# Patient Record
Sex: FEMALE | Race: OTHER | Employment: FULL TIME | URBAN - METROPOLITAN AREA
[De-identification: names, ages, dates, MRNs, and addresses within clinical notes are randomized per-mention and may not be internally consistent; named-entity substitution may affect disease eponyms.]

---

## 2020-01-31 ENCOUNTER — TELEPHONE (OUTPATIENT)
Dept: OBGYN | Age: 20
End: 2020-01-31

## 2020-08-14 ENCOUNTER — HOSPITAL ENCOUNTER (EMERGENCY)
Age: 20
Discharge: LEFT BEFORE DISCHARGE | End: 2020-08-14
Attending: EMERGENCY MEDICINE

## 2025-04-23 ENCOUNTER — OFFICE VISIT (OUTPATIENT)
Age: 25
End: 2025-04-23
Payer: MEDICAID

## 2025-04-23 VITALS
RESPIRATION RATE: 18 BRPM | DIASTOLIC BLOOD PRESSURE: 67 MMHG | TEMPERATURE: 98.8 F | HEIGHT: 61 IN | SYSTOLIC BLOOD PRESSURE: 109 MMHG | BODY MASS INDEX: 44.82 KG/M2 | WEIGHT: 237.4 LBS | HEART RATE: 87 BPM

## 2025-04-23 DIAGNOSIS — G47.9 SLEEP DISTURBANCE: ICD-10-CM

## 2025-04-23 DIAGNOSIS — N60.12 FIBROCYSTIC BREAST CHANGES OF BOTH BREASTS: ICD-10-CM

## 2025-04-23 DIAGNOSIS — N60.11 FIBROCYSTIC BREAST CHANGES OF BOTH BREASTS: ICD-10-CM

## 2025-04-23 DIAGNOSIS — Z00.00 BLOOD TESTS FOR ROUTINE GENERAL PHYSICAL EXAMINATION: ICD-10-CM

## 2025-04-23 DIAGNOSIS — Z78.9 NONSMOKER: ICD-10-CM

## 2025-04-23 DIAGNOSIS — E66.813 CLASS 3 SEVERE OBESITY DUE TO EXCESS CALORIES WITHOUT SERIOUS COMORBIDITY WITH BODY MASS INDEX (BMI) OF 40.0 TO 44.9 IN ADULT (HCC): Primary | ICD-10-CM

## 2025-04-23 PROCEDURE — 99212 OFFICE O/P EST SF 10 MIN: CPT

## 2025-04-23 PROCEDURE — 99214 OFFICE O/P EST MOD 30 MIN: CPT | Performed by: FAMILY MEDICINE

## 2025-04-23 SDOH — ECONOMIC STABILITY: FOOD INSECURITY: WITHIN THE PAST 12 MONTHS, YOU WORRIED THAT YOUR FOOD WOULD RUN OUT BEFORE YOU GOT MONEY TO BUY MORE.: NEVER TRUE

## 2025-04-23 SDOH — ECONOMIC STABILITY: FOOD INSECURITY: WITHIN THE PAST 12 MONTHS, THE FOOD YOU BOUGHT JUST DIDN'T LAST AND YOU DIDN'T HAVE MONEY TO GET MORE.: NEVER TRUE

## 2025-04-23 ASSESSMENT — PATIENT HEALTH QUESTIONNAIRE - PHQ9
1. LITTLE INTEREST OR PLEASURE IN DOING THINGS: NOT AT ALL
SUM OF ALL RESPONSES TO PHQ QUESTIONS 1-9: 0
2. FEELING DOWN, DEPRESSED OR HOPELESS: NOT AT ALL
SUM OF ALL RESPONSES TO PHQ QUESTIONS 1-9: 0

## 2025-04-23 NOTE — PATIENT INSTRUCTIONS
Follow up on 5/16/2025 physical  Will get labs today  Will order sleep study that you can do at [facility]  Evening primrose oil talbets or topical would be beneficial for your breast pain and eliminating caffeine as well as weight loss   Recommend starting with 150minutes of moderate intensity exercise per week   Recommend reducing carbs and limiting meals to 2 hours prior to sleeping and higher protein meals  Please call the office if you have any questions or concerns  If you have not signed up for [employer] please sign up, you can reach out to me, see your lab results and request refills for medications    Appointments can be made via [employer] as well, with the standard coming 15 minutes prior to appointments  Thank you for your visit today!

## 2025-04-23 NOTE — PROGRESS NOTES
[facility]  [address] [place] [address]  Phone: [phone]  Fax: [phone]      Date of Visit:  2025  Patient Name: Eunice Clark   Patient :  2000     Chief Complaint   Patient presents with    Establish Care     Re-established.  Was with us about 3 years ago.      Breast Problem     Noticed \"bruising\" around the breast area.  Did breast feed/ pump, not sure if this is the cause.    Maternal grandmother: history of colon cancer  Maternal grandfather: cancer    Weight Management     Just had a baby about a year ago.      HPI:     Eunice Clark is a 25 y.o. female for weight management and breast tenderness. She states that her breast get more tender around the time of her menstrual cycle. She states that it got worse after her last pregnancy where she breast fed for about a month and has not breast fed in about a year. Discussed that this is likely fibrocystic changes of the breast and decreased caffeine and primrose oil can help. She is open to trying to that. As far as weight loss at this time she would like to trying diet and exercise regimen. She currently states that her diet is poor and is high in carbs and she does not much for exercise. Discussed recommendations as below. She has been told that she snores and discussed that sleep apnea can contribute to weight gain. Discussed sending for a sleep study which she is agreeable to. No other concerns today.     I personally reviewed the patient's past medical history, current medications, allergies, surgical history, family history and social history.  Updates were made as necessary.    REVIEW OF SYSTEM      As Per HPI    REVIEWED INFORMATION      Allergies   Allergen Reactions    Peanut-Containing Drug Products        There is no problem list on file for this patient.      Past Medical History:   Diagnosis Date    Abnormal uterine bleeding     Anxiety        Past

## 2025-05-16 DIAGNOSIS — G47.9 SLEEP DISTURBANCE: Primary | ICD-10-CM

## 2025-05-30 ENCOUNTER — OFFICE VISIT (OUTPATIENT)
Age: 25
End: 2025-05-30
Payer: MEDICAID

## 2025-05-30 ENCOUNTER — HOSPITAL ENCOUNTER (OUTPATIENT)
Age: 25
Setting detail: SPECIMEN
Discharge: HOME | End: 2025-05-30

## 2025-05-30 VITALS
HEIGHT: 61 IN | HEART RATE: 85 BPM | DIASTOLIC BLOOD PRESSURE: 64 MMHG | WEIGHT: 234 LBS | SYSTOLIC BLOOD PRESSURE: 107 MMHG | RESPIRATION RATE: 16 BRPM | BODY MASS INDEX: 44.18 KG/M2 | TEMPERATURE: 98.2 F

## 2025-05-30 DIAGNOSIS — E66.813 CLASS 3 SEVERE OBESITY DUE TO EXCESS CALORIES WITHOUT SERIOUS COMORBIDITY WITH BODY MASS INDEX (BMI) OF 40.0 TO 44.9 IN ADULT (HCC): ICD-10-CM

## 2025-05-30 DIAGNOSIS — Z00.01 ENCOUNTER FOR WELL ADULT EXAM WITH ABNORMAL FINDINGS: Primary | ICD-10-CM

## 2025-05-30 DIAGNOSIS — G47.9 SLEEP DISTURBANCE: ICD-10-CM

## 2025-05-30 DIAGNOSIS — Z78.9 NONSMOKER: ICD-10-CM

## 2025-05-30 DIAGNOSIS — Z00.00 BLOOD TESTS FOR ROUTINE GENERAL PHYSICAL EXAMINATION: ICD-10-CM

## 2025-05-30 LAB
25(OH)D3 SERPL-MCNC: 15.3 NG/ML (ref 30–100)
ALBUMIN SERPL-MCNC: 4.5 G/DL (ref 3.5–5.2)
ALBUMIN/GLOB SERPL: 1.4 {RATIO} (ref 1–2.5)
ALP SERPL-CCNC: 105 U/L (ref 35–104)
ALT SERPL-CCNC: 47 U/L (ref 10–35)
ANION GAP SERPL CALCULATED.3IONS-SCNC: 14 MMOL/L (ref 9–16)
AST SERPL-CCNC: 34 U/L (ref 10–35)
BASOPHILS # BLD: <0.03 K/UL (ref 0–0.2)
BASOPHILS NFR BLD: 0 % (ref 0–2)
BILIRUB SERPL-MCNC: 0.2 MG/DL (ref 0–1.2)
BUN SERPL-MCNC: 10 MG/DL (ref 6–20)
CALCIUM SERPL-MCNC: 9.5 MG/DL (ref 8.6–10.4)
CHLORIDE SERPL-SCNC: 104 MMOL/L (ref 98–107)
CHOLEST SERPL-MCNC: 191 MG/DL (ref 0–199)
CHOLESTEROL/HDL RATIO: 5.3
CO2 SERPL-SCNC: 22 MMOL/L (ref 20–31)
CREAT SERPL-MCNC: 0.7 MG/DL (ref 0.6–0.9)
EOSINOPHIL # BLD: 0.28 K/UL (ref 0–0.44)
EOSINOPHILS RELATIVE PERCENT: 3 % (ref 1–4)
ERYTHROCYTE [DISTWIDTH] IN BLOOD BY AUTOMATED COUNT: 12.2 % (ref 11.8–14.4)
FERRITIN SERPL-MCNC: 51 NG/ML (ref 15–150)
FOLATE SERPL-MCNC: 18.9 NG/ML (ref 4.8–24.2)
GFR, ESTIMATED: >90 ML/MIN/1.73M2
GLUCOSE P FAST SERPL-MCNC: 91 MG/DL (ref 74–99)
HCT VFR BLD AUTO: 40.5 % (ref 36.3–47.1)
HCV AB SERPL QL IA: NONREACTIVE
HDLC SERPL-MCNC: 36 MG/DL
HGB BLD-MCNC: 13 G/DL (ref 11.9–15.1)
HIV 1+2 AB+HIV1 P24 AG SERPL QL IA: NONREACTIVE
IMM GRANULOCYTES # BLD AUTO: <0.03 K/UL (ref 0–0.3)
IMM GRANULOCYTES NFR BLD: 0 %
IRON SATN MFR SERPL: 11 % (ref 20–55)
IRON SERPL-MCNC: 39 UG/DL (ref 37–145)
LDLC SERPL CALC-MCNC: 103 MG/DL (ref 0–100)
LYMPHOCYTES NFR BLD: 2.24 K/UL (ref 1.1–3.7)
LYMPHOCYTES RELATIVE PERCENT: 27 % (ref 24–43)
MCH RBC QN AUTO: 28.4 PG (ref 25.2–33.5)
MCHC RBC AUTO-ENTMCNC: 32.1 G/DL (ref 28.4–34.8)
MCV RBC AUTO: 88.4 FL (ref 82.6–102.9)
MONOCYTES NFR BLD: 0.44 K/UL (ref 0.1–1.2)
MONOCYTES NFR BLD: 5 % (ref 3–12)
NEUTROPHILS NFR BLD: 65 % (ref 36–65)
NEUTS SEG NFR BLD: 5.26 K/UL (ref 1.5–8.1)
NRBC BLD-RTO: 0 PER 100 WBC
PLATELET # BLD AUTO: 279 K/UL (ref 138–453)
PMV BLD AUTO: 11.5 FL (ref 8.1–13.5)
POTASSIUM SERPL-SCNC: 4.5 MMOL/L (ref 3.7–5.3)
PROT SERPL-MCNC: 7.8 G/DL (ref 6.6–8.7)
RBC # BLD AUTO: 4.58 M/UL (ref 3.95–5.11)
SODIUM SERPL-SCNC: 140 MMOL/L (ref 136–145)
TIBC SERPL-MCNC: 343 UG/DL (ref 250–450)
TRIGL SERPL-MCNC: 258 MG/DL (ref 0–149)
TSH SERPL DL<=0.05 MIU/L-ACNC: 1.03 UIU/ML (ref 0.27–4.2)
UNSATURATED IRON BINDING CAPACITY: 304 UG/DL (ref 112–347)
VIT B12 SERPL-MCNC: 718 PG/ML (ref 232–1245)
VLDLC SERPL CALC-MCNC: 52 MG/DL (ref 1–30)
WBC OTHER # BLD: 8.3 K/UL (ref 3.5–11.3)

## 2025-05-30 PROCEDURE — 99395 PREV VISIT EST AGE 18-39: CPT

## 2025-05-30 NOTE — PROGRESS NOTES
[facility]  [address] [place] [address]  Phone: [phone]  Fax: [phone]      Date of Visit:  2025  Patient Name: Eunice Clark   Patient :  2000     Chief Complaint   Patient presents with    Annual Exam     Physical- Will get tetanus thru health dept-       HPI:     Eunice Clark is a 25 y.o. female who presents today to discuss annual physical.     Subjective:  Health goals for the upcoming year (and/or overall health goals): lose weight- get back down to 150lbs is the ultimate goal     Gynecologic History:  Menarche: 12 yo  Menstrual history: No LMP recorded.  Permanent Sterilization: No   Reversible Birth Control: No      Hormone Replacement Exposure: No    Vasomotor and/or genitourinary symptoms of menopause: No  Genetic Qualified Family History of Breast, Ovarian, Colon or Uterine Cancer: Yes colon grandmother on mom side    Obstetrics Planning:  Planning on pregnancy within next year: no  On folic acid (400mcg daily or 800mcg daily in obesity): yes    Sexual Health:  Gender identity and sexual preference: female and prefer male  Sexually active: yes  Concerns about sexual dysfunction: no  STI exposure/risks: no    General Health:  Diet: in the morning eating 2 tortilla with spinach and eggs pancake, home cooked meals mostly, lunch mostly turkey sandwich. Dinner mostly pasta will trial veggie pasta   Exercise: have not been exercising, will trial go to the gym once a week   Sleep: will be doing home sleep study soon  Dental health: last appointment few months ago  Eye health: occasional eye issue, recommended eye doc appointment  Vaccinations up to date: will need to get TDAP  High risk activities/behaviors: no  Home life concerns: no  Support system: good support  Depression/Anxiety concerns: anxiety really bad     Preventative Health Testing:  Last cervical cancer screening/pap/hrHPV (21-28yo cytology Q3yrs

## 2025-05-30 NOTE — PATIENT INSTRUCTIONS
Follow up as needed   Will send for lab work   Please get done and will message with results   Please call the office if you have any questions or concerns  If you have not signed up for [employer] please sign up, you can reach out to me, see your lab results and request refills for medications    Appointments can be made via [employer] as well, with the standard coming 15 minutes prior to appointments  Thank you for your visit today!

## 2025-06-02 ENCOUNTER — RESULTS FOLLOW-UP (OUTPATIENT)
Age: 25
End: 2025-06-02